# Patient Record
Sex: MALE | Race: WHITE | URBAN - METROPOLITAN AREA
[De-identification: names, ages, dates, MRNs, and addresses within clinical notes are randomized per-mention and may not be internally consistent; named-entity substitution may affect disease eponyms.]

---

## 2020-06-16 ENCOUNTER — COMMUNICATION - HEALTHEAST (OUTPATIENT)
Dept: SCHEDULING | Facility: CLINIC | Age: 20
End: 2020-06-16

## 2021-06-08 NOTE — TELEPHONE ENCOUNTER
Dr. Nj,    I spoke with the patient's mother who would like Geoffrey to be tested for COVID d/t recent exposure to a positive patient. I did speak with Geoffrey who gave consent to communicate.  The patient is asymptomatic today but lives with his terminally ill mother. His mother is Walt Randall and she is one of your patients. I sent you a separate message about this through Walt's chart. Wondering if you are able to order COVID test for Geoffrey under these circumstances. Thanks    Carey Dorado RN

## 2021-06-08 NOTE — TELEPHONE ENCOUNTER
Patient recommended to complete [facility] visit to address COVID concerns and initiate approval for testing.    Carey Dorado RN